# Patient Record
Sex: MALE | Race: BLACK OR AFRICAN AMERICAN | Employment: OTHER | URBAN - METROPOLITAN AREA
[De-identification: names, ages, dates, MRNs, and addresses within clinical notes are randomized per-mention and may not be internally consistent; named-entity substitution may affect disease eponyms.]

---

## 2021-12-30 ENCOUNTER — HOSPITAL ENCOUNTER (OUTPATIENT)
Age: 46
Setting detail: OUTPATIENT SURGERY
Discharge: OTHER INSTITUTION | End: 2021-12-30
Attending: INTERNAL MEDICINE | Admitting: INTERNAL MEDICINE

## 2021-12-30 ENCOUNTER — ANESTHESIA (OUTPATIENT)
Dept: ENDOSCOPY | Age: 46
End: 2021-12-30
Payer: COMMERCIAL

## 2021-12-30 ENCOUNTER — ANESTHESIA EVENT (OUTPATIENT)
Dept: ENDOSCOPY | Age: 46
End: 2021-12-30
Payer: COMMERCIAL

## 2021-12-30 ENCOUNTER — HOSPITAL ENCOUNTER (OUTPATIENT)
Age: 46
Setting detail: OBSERVATION
Discharge: HOME | End: 2021-12-31
Attending: EMERGENCY MEDICINE | Admitting: HOSPITALIST
Payer: COMMERCIAL

## 2021-12-30 DIAGNOSIS — K92.2 GASTROINTESTINAL HEMORRHAGE, UNSPECIFIED GASTROINTESTINAL HEMORRHAGE TYPE: Primary | ICD-10-CM

## 2021-12-30 LAB
ABO + RH BLD: NORMAL
ALBUMIN SERPL-MCNC: 3.2 G/DL (ref 3.5–5)
ALBUMIN/GLOB SERPL: 0.8 {RATIO} (ref 1.1–2.2)
ALP SERPL-CCNC: 74 U/L (ref 45–117)
ALT SERPL-CCNC: 31 U/L (ref 12–78)
ANION GAP SERPL CALC-SCNC: 7 MMOL/L (ref 5–15)
AST SERPL-CCNC: 12 U/L (ref 15–37)
ATRIAL RATE: 86 BPM
BASOPHILS # BLD: 0 K/UL (ref 0–0.1)
BASOPHILS # BLD: 0 K/UL (ref 0–0.1)
BASOPHILS NFR BLD: 0 % (ref 0–1)
BASOPHILS NFR BLD: 1 % (ref 0–1)
BILIRUB SERPL-MCNC: 0.3 MG/DL (ref 0.2–1)
BLOOD GROUP ANTIBODIES SERPL: NORMAL
BUN SERPL-MCNC: 16 MG/DL (ref 6–20)
BUN/CREAT SERPL: 13 (ref 12–20)
CALCIUM SERPL-MCNC: 8.9 MG/DL (ref 8.5–10.1)
CALCULATED P AXIS, ECG09: 52 DEGREES
CALCULATED R AXIS, ECG10: 97 DEGREES
CALCULATED T AXIS, ECG11: 26 DEGREES
CHLORIDE SERPL-SCNC: 105 MMOL/L (ref 97–108)
CO2 SERPL-SCNC: 24 MMOL/L (ref 21–32)
COMMENT, HOLDF: NORMAL
CREAT SERPL-MCNC: 1.28 MG/DL (ref 0.7–1.3)
DIAGNOSIS, 93000: NORMAL
DIFFERENTIAL METHOD BLD: ABNORMAL
DIFFERENTIAL METHOD BLD: NORMAL
EOSINOPHIL # BLD: 0 K/UL (ref 0–0.4)
EOSINOPHIL # BLD: 0.1 K/UL (ref 0–0.4)
EOSINOPHIL NFR BLD: 0 % (ref 0–7)
EOSINOPHIL NFR BLD: 2 % (ref 0–7)
ERYTHROCYTE [DISTWIDTH] IN BLOOD BY AUTOMATED COUNT: 14.5 % (ref 11.5–14.5)
ERYTHROCYTE [DISTWIDTH] IN BLOOD BY AUTOMATED COUNT: 14.6 % (ref 11.5–14.5)
GLOBULIN SER CALC-MCNC: 3.9 G/DL (ref 2–4)
GLUCOSE SERPL-MCNC: 117 MG/DL (ref 65–100)
HCT VFR BLD AUTO: 37.3 % (ref 36.6–50.3)
HCT VFR BLD AUTO: 38.1 % (ref 36.6–50.3)
HGB BLD-MCNC: 12.6 G/DL (ref 12.1–17)
HGB BLD-MCNC: 12.9 G/DL (ref 12.1–17)
IMM GRANULOCYTES # BLD AUTO: 0 K/UL (ref 0–0.04)
IMM GRANULOCYTES # BLD AUTO: 0 K/UL (ref 0–0.04)
IMM GRANULOCYTES NFR BLD AUTO: 0 % (ref 0–0.5)
IMM GRANULOCYTES NFR BLD AUTO: 0 % (ref 0–0.5)
LYMPHOCYTES # BLD: 1.5 K/UL (ref 0.8–3.5)
LYMPHOCYTES # BLD: 1.7 K/UL (ref 0.8–3.5)
LYMPHOCYTES NFR BLD: 20 % (ref 12–49)
LYMPHOCYTES NFR BLD: 32 % (ref 12–49)
MCH RBC QN AUTO: 30 PG (ref 26–34)
MCH RBC QN AUTO: 30.1 PG (ref 26–34)
MCHC RBC AUTO-ENTMCNC: 33.8 G/DL (ref 30–36.5)
MCHC RBC AUTO-ENTMCNC: 33.9 G/DL (ref 30–36.5)
MCV RBC AUTO: 88.6 FL (ref 80–99)
MCV RBC AUTO: 89.2 FL (ref 80–99)
MONOCYTES # BLD: 0.5 K/UL (ref 0–1)
MONOCYTES # BLD: 0.7 K/UL (ref 0–1)
MONOCYTES NFR BLD: 13 % (ref 5–13)
MONOCYTES NFR BLD: 7 % (ref 5–13)
NEUTS SEG # BLD: 2.9 K/UL (ref 1.8–8)
NEUTS SEG # BLD: 5.5 K/UL (ref 1.8–8)
NEUTS SEG NFR BLD: 52 % (ref 32–75)
NEUTS SEG NFR BLD: 73 % (ref 32–75)
NRBC # BLD: 0 K/UL (ref 0–0.01)
NRBC # BLD: 0 K/UL (ref 0–0.01)
NRBC BLD-RTO: 0 PER 100 WBC
NRBC BLD-RTO: 0 PER 100 WBC
P-R INTERVAL, ECG05: 174 MS
PLATELET # BLD AUTO: 260 K/UL (ref 150–400)
PLATELET # BLD AUTO: 278 K/UL (ref 150–400)
PMV BLD AUTO: 9.8 FL (ref 8.9–12.9)
PMV BLD AUTO: 9.8 FL (ref 8.9–12.9)
POTASSIUM SERPL-SCNC: 3.8 MMOL/L (ref 3.5–5.1)
PROT SERPL-MCNC: 7.1 G/DL (ref 6.4–8.2)
Q-T INTERVAL, ECG07: 370 MS
QRS DURATION, ECG06: 92 MS
QTC CALCULATION (BEZET), ECG08: 442 MS
RBC # BLD AUTO: 4.18 M/UL (ref 4.1–5.7)
RBC # BLD AUTO: 4.3 M/UL (ref 4.1–5.7)
SAMPLES BEING HELD,HOLD: NORMAL
SODIUM SERPL-SCNC: 136 MMOL/L (ref 136–145)
SPECIMEN EXP DATE BLD: NORMAL
VENTRICULAR RATE, ECG03: 86 BPM
WBC # BLD AUTO: 5.4 K/UL (ref 4.1–11.1)
WBC # BLD AUTO: 7.5 K/UL (ref 4.1–11.1)

## 2021-12-30 PROCEDURE — 74011250637 HC RX REV CODE- 250/637: Performed by: INTERNAL MEDICINE

## 2021-12-30 PROCEDURE — 80053 COMPREHEN METABOLIC PANEL: CPT

## 2021-12-30 PROCEDURE — 76040000019: Performed by: INTERNAL MEDICINE

## 2021-12-30 PROCEDURE — 74011250636 HC RX REV CODE- 250/636: Performed by: INTERNAL MEDICINE

## 2021-12-30 PROCEDURE — G0378 HOSPITAL OBSERVATION PER HR: HCPCS

## 2021-12-30 PROCEDURE — 74011250636 HC RX REV CODE- 250/636: Performed by: EMERGENCY MEDICINE

## 2021-12-30 PROCEDURE — 76060000031 HC ANESTHESIA FIRST 0.5 HR: Performed by: INTERNAL MEDICINE

## 2021-12-30 PROCEDURE — 93005 ELECTROCARDIOGRAM TRACING: CPT

## 2021-12-30 PROCEDURE — 74011250636 HC RX REV CODE- 250/636: Performed by: NURSE ANESTHETIST, CERTIFIED REGISTERED

## 2021-12-30 PROCEDURE — 85025 COMPLETE CBC W/AUTO DIFF WBC: CPT

## 2021-12-30 PROCEDURE — 74011000250 HC RX REV CODE- 250: Performed by: NURSE ANESTHETIST, CERTIFIED REGISTERED

## 2021-12-30 PROCEDURE — 99285 EMERGENCY DEPT VISIT HI MDM: CPT

## 2021-12-30 PROCEDURE — 86900 BLOOD TYPING SEROLOGIC ABO: CPT

## 2021-12-30 PROCEDURE — 36415 COLL VENOUS BLD VENIPUNCTURE: CPT

## 2021-12-30 PROCEDURE — 74011250636 HC RX REV CODE- 250/636: Performed by: HOSPITALIST

## 2021-12-30 PROCEDURE — 77030040684 HC NDL ENDOSC NEEDLEMASTR OCOA -B: Performed by: INTERNAL MEDICINE

## 2021-12-30 PROCEDURE — 77030010936 HC CLP LIG BSC -C: Performed by: INTERNAL MEDICINE

## 2021-12-30 DEVICE — WORKING LENGTH 235CM, WORKING CHANNEL 2.8MM
Type: IMPLANTABLE DEVICE | Site: SIGMOID COLON | Status: FUNCTIONAL
Brand: RESOLUTION 360 CLIP

## 2021-12-30 RX ORDER — MELATONIN 5 MG
10 CAPSULE ORAL AS NEEDED
COMMUNITY

## 2021-12-30 RX ORDER — AMLODIPINE BESYLATE 10 MG/1
10 TABLET ORAL DAILY
COMMUNITY

## 2021-12-30 RX ORDER — DEXTROMETHORPHAN/PSEUDOEPHED 2.5-7.5/.8
1.2 DROPS ORAL
Status: DISCONTINUED | OUTPATIENT
Start: 2021-12-30 | End: 2021-12-30 | Stop reason: HOSPADM

## 2021-12-30 RX ORDER — SODIUM CHLORIDE 9 MG/ML
INJECTION, SOLUTION INTRAVENOUS
Status: DISCONTINUED | OUTPATIENT
Start: 2021-12-30 | End: 2021-12-30 | Stop reason: HOSPADM

## 2021-12-30 RX ORDER — NALOXONE HYDROCHLORIDE 0.4 MG/ML
0.4 INJECTION, SOLUTION INTRAMUSCULAR; INTRAVENOUS; SUBCUTANEOUS
Status: DISCONTINUED | OUTPATIENT
Start: 2021-12-30 | End: 2021-12-30 | Stop reason: HOSPADM

## 2021-12-30 RX ORDER — ATROPINE SULFATE 0.1 MG/ML
0.5 INJECTION INTRAVENOUS
Status: DISCONTINUED | OUTPATIENT
Start: 2021-12-30 | End: 2021-12-30 | Stop reason: HOSPADM

## 2021-12-30 RX ORDER — SODIUM CHLORIDE 0.9 % (FLUSH) 0.9 %
5-40 SYRINGE (ML) INJECTION AS NEEDED
Status: DISCONTINUED | OUTPATIENT
Start: 2021-12-30 | End: 2021-12-31 | Stop reason: HOSPADM

## 2021-12-30 RX ORDER — EPINEPHRINE 0.1 MG/ML
1 INJECTION INTRACARDIAC; INTRAVENOUS
Status: COMPLETED | OUTPATIENT
Start: 2021-12-30 | End: 2021-12-30

## 2021-12-30 RX ORDER — PROPOFOL 10 MG/ML
INJECTION, EMULSION INTRAVENOUS AS NEEDED
Status: DISCONTINUED | OUTPATIENT
Start: 2021-12-30 | End: 2021-12-30 | Stop reason: HOSPADM

## 2021-12-30 RX ORDER — DEXTROMETHORPHAN POLISTIREX 30 MG/5 ML
SUSPENSION, EXTENDED RELEASE 12 HR ORAL AS NEEDED
Status: DISCONTINUED | OUTPATIENT
Start: 2021-12-30 | End: 2021-12-31 | Stop reason: HOSPADM

## 2021-12-30 RX ORDER — FLUMAZENIL 0.1 MG/ML
0.2 INJECTION INTRAVENOUS
Status: DISCONTINUED | OUTPATIENT
Start: 2021-12-30 | End: 2021-12-30 | Stop reason: HOSPADM

## 2021-12-30 RX ORDER — EPINEPHRINE 1 MG/ML
INJECTION, SOLUTION, CONCENTRATE INTRAVENOUS
Status: DISCONTINUED
Start: 2021-12-30 | End: 2021-12-30 | Stop reason: WASHOUT

## 2021-12-30 RX ORDER — SODIUM CHLORIDE 9 MG/ML
100 INJECTION, SOLUTION INTRAVENOUS CONTINUOUS
Status: DISPENSED | OUTPATIENT
Start: 2021-12-30 | End: 2021-12-31

## 2021-12-30 RX ORDER — SODIUM CHLORIDE 9 MG/ML
50 INJECTION, SOLUTION INTRAVENOUS CONTINUOUS
Status: DISCONTINUED | OUTPATIENT
Start: 2021-12-30 | End: 2021-12-30

## 2021-12-30 RX ORDER — SODIUM CHLORIDE 0.9 % (FLUSH) 0.9 %
5-40 SYRINGE (ML) INJECTION EVERY 8 HOURS
Status: DISCONTINUED | OUTPATIENT
Start: 2021-12-30 | End: 2021-12-31 | Stop reason: HOSPADM

## 2021-12-30 RX ORDER — LIDOCAINE HYDROCHLORIDE 20 MG/ML
INJECTION, SOLUTION EPIDURAL; INFILTRATION; INTRACAUDAL; PERINEURAL AS NEEDED
Status: DISCONTINUED | OUTPATIENT
Start: 2021-12-30 | End: 2021-12-30 | Stop reason: HOSPADM

## 2021-12-30 RX ADMIN — PROPOFOL 20 MG: 10 INJECTION, EMULSION INTRAVENOUS at 16:39

## 2021-12-30 RX ADMIN — PROPOFOL 20 MG: 10 INJECTION, EMULSION INTRAVENOUS at 16:48

## 2021-12-30 RX ADMIN — SODIUM CHLORIDE: 900 INJECTION, SOLUTION INTRAVENOUS at 16:37

## 2021-12-30 RX ADMIN — PROPOFOL 100 MG: 10 INJECTION, EMULSION INTRAVENOUS at 16:37

## 2021-12-30 RX ADMIN — PROPOFOL 20 MG: 10 INJECTION, EMULSION INTRAVENOUS at 16:45

## 2021-12-30 RX ADMIN — MINERAL OIL: 100 ENEMA RECTAL at 16:13

## 2021-12-30 RX ADMIN — EPINEPHRINE 0.1 MG: 0.1 INJECTION INTRACARDIAC; INTRAVENOUS at 16:51

## 2021-12-30 RX ADMIN — PROPOFOL 20 MG: 10 INJECTION, EMULSION INTRAVENOUS at 16:56

## 2021-12-30 RX ADMIN — PROPOFOL 20 MG: 10 INJECTION, EMULSION INTRAVENOUS at 16:41

## 2021-12-30 RX ADMIN — SODIUM CHLORIDE, POTASSIUM CHLORIDE, SODIUM LACTATE AND CALCIUM CHLORIDE 1000 ML: 600; 310; 30; 20 INJECTION, SOLUTION INTRAVENOUS at 14:25

## 2021-12-30 RX ADMIN — LIDOCAINE HYDROCHLORIDE 60 MG: 20 INJECTION, SOLUTION EPIDURAL; INFILTRATION; INTRACAUDAL; PERINEURAL at 16:37

## 2021-12-30 RX ADMIN — PROPOFOL 20 MG: 10 INJECTION, EMULSION INTRAVENOUS at 16:43

## 2021-12-30 RX ADMIN — SODIUM CHLORIDE 100 ML/HR: 900 INJECTION, SOLUTION INTRAVENOUS at 19:00

## 2021-12-30 RX ADMIN — PROPOFOL 20 MG: 10 INJECTION, EMULSION INTRAVENOUS at 16:50

## 2021-12-30 RX ADMIN — PROPOFOL 20 MG: 10 INJECTION, EMULSION INTRAVENOUS at 16:58

## 2021-12-30 RX ADMIN — PROPOFOL 20 MG: 10 INJECTION, EMULSION INTRAVENOUS at 16:53

## 2021-12-30 NOTE — ANESTHESIA PREPROCEDURE EVALUATION
Relevant Problems   No relevant active problems       Anesthetic History   No history of anesthetic complications            Review of Systems / Medical History  Patient summary reviewed, nursing notes reviewed and pertinent labs reviewed    Pulmonary        Sleep apnea: CPAP           Neuro/Psych   Within defined limits           Cardiovascular    Hypertension              Exercise tolerance: >4 METS     GI/Hepatic/Renal               Comments: Bloody stools following polypectomy today Endo/Other        Morbid obesity     Other Findings              Physical Exam    Airway  Mallampati: III  TM Distance: > 6 cm  Neck ROM: normal range of motion   Mouth opening: Normal     Cardiovascular  Regular rate and rhythm,  S1 and S2 normal,  no murmur, click, rub, or gallop             Dental  No notable dental hx       Pulmonary  Breath sounds clear to auscultation               Abdominal  GI exam deferred       Other Findings            Anesthetic Plan    ASA: 3  Anesthesia type: MAC          Induction: Intravenous  Anesthetic plan and risks discussed with: Patient

## 2021-12-30 NOTE — PERIOP NOTES
TRANSFER - IN REPORT:    Verbal report received from Lazarus Romo, [facility] (name) on Andrei Julee  being received from ER (unit) for ordered procedure      Information from the following report(s) SBAR, ED Summary, MAR, Recent Results, Pre Procedure Checklist, Procedure Verification and Quality Measures was reviewed with the receiving nurse. Opportunity for questions and clarification was provided.

## 2021-12-30 NOTE — PROGRESS NOTES
Spiritual Care Assessment/Progress Note  [facility]      NAME: Andrei Ladd      MRN: [id number]  AGE: 51 y.o. SEX: male  Confucianist Affiliation: Jain   Language: English     12/30/2021     Total Time (in minutes): 20     Spiritual Assessment begun in [address] through conversation with:         []Patient        [] Family    [] Friend(s)        Reason for Consult: Code Blue/99     Spiritual beliefs: (Please include comment if needed)     [] Identifies with a jarocho tradition:         [] Supported by a jarocho community:            [] Claims no spiritual orientation:           [] Seeking spiritual identity:                [] Adheres to an individual form of spirituality:           [x] Not able to assess:                           Identified resources for coping:      [] Prayer                               [] Music                  [] Guided Imagery     [] Family/friends                 [] Pet visits     [] Devotional reading                         [x] Unknown     [] Other:                                               Interventions offered during this visit: (See comments for more details)                Plan of Care:     [x] Support spiritual and/or cultural needs    [] Support AMD and/or advance care planning process      [] Support grieving process   [] Coordinate Rites and/or Rituals    [] Coordination with community clergy   [] No spiritual needs identified at this time   [] Detailed Plan of Care below (See Comments)  [] Make referral to Music Therapy  [] Make referral to Pet Therapy     [] Make referral to Addiction services  [] Make referral to [facility]  [] Make referral to Spiritual Care Partner  [] No future visits requested        [x] Contact Spiritual Care for further referrals     Comments: Response to Code Stroke in Outpatient waiting area. Unable to assess due to medical interventions. Patient spouse was present. Advised of  availability.  Please contact Spiritual Care for any further referrals. Visited by: Kathia Rosario.  Eula Omer, [address] [place] [address] paging Service [phone])

## 2021-12-30 NOTE — H&P
History & Physical    Primary Care Provider: Other, MD Peter  Source of Information: Patient     History of Presenting Illness:   Jass France is a 51 y.o. male who presents with syncope and rectal bleeding     This is a 26-year-old male who was just released this morning from having colonoscopy and removal of a large polyp. Since getting home, he has had continued bleeding and was told to come back to the hospital by GI. He was in registration and had a syncopal episode. He apparently had blood in the chair and blood in the floor when he was picked up and put on stretcher. Is having no abdominal pain has had no nausea or vomiting. He did take some blood pressure medication on his way back to the hospital prior to syncope. He has been seen in the ED by GI and they are taking him back to the ENDO for further colonoscopy: s/p clips and epi injection. And he will be monitored post procedure. He is currently alert and oriented x3 and without complaint. No abd pain, no more bleeding. Review of Systems:  General: HPI, no changes of weight  HEENT: no headache, no vision changes, no nose discharge, no hearing changes   RES: no wheezing, no cough, no sob  CVS: no cp, no palpitation. Muscular: no joint swelling, no muscle pain, no leg swelling  Skin: no rash, no itching   GI: HPI   : no dysuria, no hematuria  Hemo: no gum bleeding, no petechial   Neuro: no sensation changes, no focal weakness , HPI   Endo: no polydipsia   Psych: denied depression     History reviewed. No pertinent past medical history. Past Surgical History:   Procedure Laterality Date    FLEXIBLE SIGMOIDOSCOPY N/A 12/30/2021    SIGMOIDOSCOPY FLEXIBLE performed by Kayla Lim MD at [facility] ENDOSCOPY    HX UROLOGICAL      kidney stone surgery     Prior to Admission medications    Medication Sig Start Date End Date Taking?  Authorizing Provider   amLODIPine (NORVASC) 10 mg tablet Take 10 mg by mouth daily. Yes Provider, Historical   melatonin 5 mg cap capsule Take 10 mg by mouth as needed. bedtime   Yes Provider, Historical     No Known Allergies   History reviewed. No pertinent family history. SOCIAL HISTORY:  Patient resides:  Independently x   Assisted Living    SNF    With family care       Smoking history:   None x   Former    Chronic      Alcohol history:   None x   Social    Chronic      Ambulates:   Independently x   w/cane    w/walker    w/wc    CODE STATUS:  DNR    Full x   Other      Objective:     Physical Exam:     Visit Vitals  /74   Pulse 79   Temp 99.7 °F (37.6 °C)   Resp 19   Ht 6' 6\" (1.981 m)   Wt 158.8 kg (350 lb)   SpO2 97%   BMI 40.45 kg/m²      O2 Device: None    General:  Alert, cooperative, no distress, appears stated age. Head:  Normocephalic, without obvious abnormality, atraumatic. Eyes:  Conjunctivae/corneas clear. PERRL, EOMs intact. Nose: Nares normal. Septum midline. Mucosa normal. No drainage or sinus tenderness. Throat: Lips, mucosa, and tongue normal. Teeth and gums normal.   Neck: Supple, symmetrical, trachea midline, no adenopathy, thyroid: no enlargement/tenderness/nodules, no carotid bruit and no JVD. Back:   Symmetric, no curvature. ROM normal. No CVA tenderness. Lungs:   Clear to auscultation bilaterally. Chest wall:  No tenderness or deformity. Heart:  Regular rate and rhythm, S1, S2 normal, no murmur, click, rub or gallop. Abdomen:   Soft, non-tender. Bowel sounds normal. No masses,  No organomegaly. Extremities: Extremities normal, atraumatic, no cyanosis or edema. Pulses: 2+ and symmetric all extremities. Skin: Skin color, texture, turgor normal. No rashes or lesions   Neurologic: CNII-XII intact.  None focal          Data Review:     Recent Days:  Recent Labs     12/30/21  1417   WBC 5.4   HGB 12.9   HCT 38.1        Recent Labs     12/30/21  1417      K 3.8      CO2 24   *   BUN 16   CREA 1.28   CA 8. 9   ALB 3.2*   ALT 31     No results for input(s): PH, PCO2, PO2, HCO3, FIO2 in the last 72 hours. 24 Hour Results:  Recent Results (from the past 24 hour(s))   EKG, 12 LEAD, INITIAL    Collection Time: 12/30/21  2:07 PM   Result Value Ref Range    Ventricular Rate 86 BPM    Atrial Rate 86 BPM    P-R Interval 174 ms    QRS Duration 92 ms    Q-T Interval 370 ms    QTC Calculation (Bezet) 442 ms    Calculated P Axis 52 degrees    Calculated R Axis 97 degrees    Calculated T Axis 26 degrees    Diagnosis       Normal sinus rhythm  Rightward axis  Otherwise normal  No previous ECGs available  Confirmed by Tessie Gilman M.D., Ashley Ruff ([id number]) on 12/30/2021 4:40:19 PM     CBC WITH AUTOMATED DIFF    Collection Time: 12/30/21  2:17 PM   Result Value Ref Range    WBC 5.4 4.1 - 11.1 K/uL    RBC 4.30 4. 10 - 5.70 M/uL    HGB 12.9 12.1 - 17.0 g/dL    HCT 38.1 36.6 - 50.3 %    MCV 88.6 80.0 - 99.0 FL    MCH 30.0 26.0 - 34.0 PG    MCHC 33.9 30.0 - 36.5 g/dL    RDW 14.6 (H) 11.5 - 14.5 %    PLATELET 427 349 - 230 K/uL    MPV 9.8 8.9 - 12.9 FL    NRBC 0.0 0  WBC    ABSOLUTE NRBC 0.00 0.00 - 0.01 K/uL    NEUTROPHILS 52 32 - 75 %    LYMPHOCYTES 32 12 - 49 %    MONOCYTES 13 5 - 13 %    EOSINOPHILS 2 0 - 7 %    BASOPHILS 1 0 - 1 %    IMMATURE GRANULOCYTES 0 0.0 - 0.5 %    ABS. NEUTROPHILS 2.9 1.8 - 8.0 K/UL    ABS. LYMPHOCYTES 1.7 0.8 - 3.5 K/UL    ABS. MONOCYTES 0.7 0.0 - 1.0 K/UL    ABS. EOSINOPHILS 0.1 0.0 - 0.4 K/UL    ABS. BASOPHILS 0.0 0.0 - 0.1 K/UL    ABS. IMM.  GRANS. 0.0 0.00 - 0.04 K/UL    DF AUTOMATED     METABOLIC PANEL, COMPREHENSIVE    Collection Time: 12/30/21  2:17 PM   Result Value Ref Range    Sodium 136 136 - 145 mmol/L    Potassium 3.8 3.5 - 5.1 mmol/L    Chloride 105 97 - 108 mmol/L    CO2 24 21 - 32 mmol/L    Anion gap 7 5 - 15 mmol/L    Glucose 117 (H) 65 - 100 mg/dL    BUN 16 6 - 20 MG/DL    Creatinine 1.28 0.70 - 1.30 MG/DL    BUN/Creatinine ratio 13 12 - 20      GFR est AA >60 >60 ml/min/1.73m2    GFR est non-AA >60 >60 ml/min/1.73m2    Calcium 8.9 8.5 - 10.1 MG/DL    Bilirubin, total 0.3 0.2 - 1.0 MG/DL    ALT (SGPT) 31 12 - 78 U/L    AST (SGOT) 12 (L) 15 - 37 U/L    Alk. phosphatase 74 45 - 117 U/L    Protein, total 7.1 6.4 - 8.2 g/dL    Albumin 3.2 (L) 3.5 - 5.0 g/dL    Globulin 3.9 2.0 - 4.0 g/dL    A-G Ratio 0.8 (L) 1.1 - 2.2     TYPE & SCREEN    Collection Time: 12/30/21  2:17 PM   Result Value Ref Range    Crossmatch Expiration 01/02/2022,5301     ABO/Rh(D) B POSITIVE     Antibody screen NEG    SAMPLES BEING HELD    Collection Time: 12/30/21  2:17 PM   Result Value Ref Range    SAMPLES BEING HELD 1BLU     COMMENT        Add-on orders for these samples will be processed based on acceptable specimen integrity and analyte stability, which may vary by analyte. Imaging:   No results found. Assessment:     Active Problems:    GIB (gastrointestinal bleeding) (12/30/2021)           Plan:     1. Syncoe: due to hypotension/GIB hypovolemia. Resolved now.,  2. Lower GIB: post polypectomy , taj from the residual stalk from polypectomy:Two hemostatic clips were deployed across the stalk base for secondary hemorrhage prophylaxis per GI. Will monitor CBC. Clear liquid diet tonight.         Signed By: Tylor Sheriff MD     December 30, 2021

## 2021-12-30 NOTE — PROGRESS NOTES

## 2021-12-30 NOTE — H&P
Pre-Endoscopy H&P   Chief complaint: hematochezia    HPI:  Patient presents for procedure. The indication for the procedure, the patient's history and the patient's current medications are reviewed prior to the procedure and that data is reported on the endoscopy note in the chart to which this document is attached. Any significant complaints with regard to organ systems will be noted, and if not mentioned then a review of systems is not contributory. History reviewed. No pertinent past medical history. Past Surgical History:   Procedure Laterality Date    HX UROLOGICAL      kidney stone surgery     Social   Social History     Tobacco Use    Smoking status: Never Smoker    Smokeless tobacco: Never Used   Substance Use Topics    Alcohol use: Yes     Comment: socially      History reviewed. No pertinent family history. No Known Allergies   Prior to Admission Medications   Prescriptions Last Dose Informant Patient Reported? Taking? amLODIPine (NORVASC) 10 mg tablet 12/30/2021 at Unknown time  Yes Yes   Sig: Take 10 mg by mouth daily. melatonin 5 mg cap capsule 12/28/2021  Yes Yes   Sig: Take 10 mg by mouth as needed. bedtime      Facility-Administered Medications: None       PHYSICAL EXAM:  The patient is examined immediately prior to the procedure. Visit Vitals  BP (!) 124/91   Pulse 91   Temp 97.8 °F (36.6 °C)   Resp 23   Ht 6' 6\" (1.981 m)   Wt 158.8 kg (350 lb)   SpO2 96%   BMI 40.45 kg/m²     Gen: Appears comfortable, no distress. Pulm: comfortable respirations with no abnormal audible breath sounds  CV: heart regular, well perfused  GI: abdomen flat. ASSESSMENT:  Patient here for procedure. The indication for the procedure, the patient's history and the patient's current medications are reviewed prior to the procedure and that data is reported on the endoscopy note in the chart to which this document is attached.     PLAN:  Informed consent discussion held, patient afforded an opportunity to ask questions and all questions answered. After being advised of the risks, benefits, and alternatives, the patient requested that we proceed and indicated so on a written consent form. Will proceed with procedure as planned.   Seven Silver MD

## 2021-12-30 NOTE — ED NOTES
TRANSFER - OUT REPORT:    Verbal report given to endoscopy RN(name) on Tony Reach  being transferred to endoscopy(unit) for ordered procedure       Report consisted of patients Situation, Background, Assessment and   Recommendations(SBAR). Information from the following report(s) SBAR, ED Summary and Recent Results was reviewed with the receiving nurse. Lines:   Peripheral IV 12/30/21 Left Antecubital (Active)        Opportunity for questions and clarification was provided.       Patient transported with:   [employer]

## 2021-12-30 NOTE — PERIOP NOTES
Report from [facility]  Pt verbalizes understanding of procedure findings. 1720: . TRANSFER - OUT REPORT:    Verbal report given to Chadwick(name) on Delmi Britt  being transferred to [facility](unit) for routine post - op       Report consisted of patients Situation, Background, Assessment and   Recommendations(SBAR). Information from the following report(s) Procedure Summary was reviewed with the receiving nurse. Lines:   Peripheral IV 12/30/21 Left Antecubital (Active)        Opportunity for questions and clarification was provided.       Patient transported with his wife and clothing and chart

## 2021-12-30 NOTE — ED PROVIDER NOTES
This is a 42-year-old male who was just released this morning from having colonoscopy and removal of a large polyp. Since getting home, he has had continued bleeding and was told to come back to the hospital by GI. He was in registration and had a syncopal episode. He apparently had blood in the chair and blood in the floor when he was picked up and put on stretcher. Is having no abdominal pain has had no nausea or vomiting. He did take some blood pressure medication on his way back to the hospital prior to syncope. He has been seen in the ED by GI and they are taking him back to the OR for further colonoscopy and possible cauterization or further control of the bleeding site. A rapid response was called and registration when the patient passed out. He is currently alert and oriented x3 and without complaint. No past medical history on file. No past surgical history on file. No family history on file. Social History     Socioeconomic History    Marital status: SINGLE     Spouse name: Not on file    Number of children: Not on file    Years of education: Not on file    Highest education level: Not on file   Occupational History    Not on file   Tobacco Use    Smoking status: Not on file    Smokeless tobacco: Not on file   Substance and Sexual Activity    Alcohol use: Not on file    Drug use: Not on file    Sexual activity: Not on file   Other Topics Concern    Not on file   Social History Narrative    Not on file     Social Determinants of Health     Financial Resource Strain:     Difficulty of Paying Living Expenses: Not on file   Food Insecurity:     Worried About Running Out of Food in the Last Year: Not on file    Stephany of Food in the Last Year: Not on file   Transportation Needs:     Lack of Transportation (Medical): Not on file    Lack of Transportation (Non-Medical):  Not on file   Physical Activity:     Days of Exercise per Week: Not on file    Minutes of Exercise per Session: Not on file   Stress:     Feeling of Stress : Not on file   Social Connections:     Frequency of Communication with Friends and Family: Not on file    Frequency of Social Gatherings with Friends and Family: Not on file    Attends Hindu Services: Not on file    Active Member of Clubs or Organizations: Not on file    Attends Club or Organization Meetings: Not on file    Marital Status: Not on file   Intimate Partner Violence:     Fear of Current or Ex-Partner: Not on file    Emotionally Abused: Not on file    Physically Abused: Not on file    Sexually Abused: Not on file   Housing Stability:     Unable to Pay for Housing in the Last Year: Not on file    Number of [place] in the Last Year: Not on file    Unstable Housing in the Last Year: Not on file         ALLERGIES: Patient has no known allergies. Review of Systems   Constitutional: Negative for activity change, appetite change and fatigue. HENT: Negative for ear pain, facial swelling, sore throat and trouble swallowing. Eyes: Negative for pain, discharge and visual disturbance. Respiratory: Negative for chest tightness, shortness of breath and wheezing. Cardiovascular: Negative for chest pain and palpitations. Gastrointestinal: Positive for anal bleeding. Negative for abdominal pain, blood in stool, nausea and vomiting. Genitourinary: Negative for difficulty urinating, flank pain and hematuria. Musculoskeletal: Negative for arthralgias, joint swelling, myalgias and neck pain. Skin: Negative for color change and rash. Neurological: Positive for syncope. Negative for dizziness, weakness, numbness and headaches. Hematological: Negative for adenopathy. Does not bruise/bleed easily. Psychiatric/Behavioral: Negative for behavioral problems, confusion and sleep disturbance. All other systems reviewed and are negative.       Vitals:    12/30/21 1359   BP: (!) 130/119   Pulse: 80   Resp: 24   Temp: 97.6 °F (36.4 °C)   SpO2: 95%   Weight: 158.8 kg (350 lb)   Height: 6' 6\" (1.981 m)            Physical Exam  Vitals and nursing note reviewed. Constitutional:       General: He is not in acute distress. Appearance: He is well-developed. He is not ill-appearing or diaphoretic. Comments: This is a 69-year-old male with a syncopal episode following surgical removal of a polyp. HENT:      Head: Normocephalic and atraumatic. Nose: Nose normal.   Eyes:      General: No scleral icterus. Conjunctiva/sclera: Conjunctivae normal.      Pupils: Pupils are equal, round, and reactive to light. Neck:      Thyroid: No thyromegaly. Vascular: No JVD. Trachea: No tracheal deviation. Comments: No carotid bruits noted. Cardiovascular:      Rate and Rhythm: Normal rate and regular rhythm. Heart sounds: Normal heart sounds. No murmur heard. No friction rub. No gallop. Pulmonary:      Effort: Pulmonary effort is normal. No respiratory distress. Breath sounds: Normal breath sounds. No wheezing or rales. Chest:      Chest wall: No tenderness. Abdominal:      General: Bowel sounds are normal. There is no distension. Palpations: Abdomen is soft. There is no mass. Tenderness: There is no abdominal tenderness. There is no guarding or rebound. Musculoskeletal:         General: No tenderness. Normal range of motion. Cervical back: Normal range of motion and neck supple. Lymphadenopathy:      Cervical: No cervical adenopathy. Skin:     General: Skin is warm and dry. Capillary Refill: Capillary refill takes 2 to 3 seconds. Findings: No erythema or rash. Neurological:      General: No focal deficit present. Mental Status: He is alert and oriented to person, place, and time. Cranial Nerves: No cranial nerve deficit. Coordination: Coordination normal.      Deep Tendon Reflexes: Reflexes are normal and symmetric.    Psychiatric:         Behavior: Behavior normal.         Thought Content: Thought content normal.         Judgment: Judgment normal.          MDM  Number of Diagnoses or Management Options  Gastrointestinal hemorrhage, unspecified gastrointestinal hemorrhage type: new and requires workup     Amount and/or Complexity of Data Reviewed  Clinical lab tests: ordered and reviewed  Decide to obtain previous medical records or to obtain history from someone other than the patient: yes  Review and summarize past medical records: yes  Discuss the patient with other providers: yes    Risk of Complications, Morbidity, and/or Mortality  Presenting problems: high  Diagnostic procedures: moderate  Management options: moderate    Patient Progress  Patient progress: stable         Procedures    This is a 50-year-old male who had a syncopal episode in registration after having polypectomy and continued rectal bleeding this morning. He denies any abdominal pain and hemodynamically appears to be stable. We will give him some additional IV fluids and check his hemoglobin. Patient is already been seen by GI and the plan is to take him back down to the operating room for another flex sig and control the bleeding. Patient is alert and oriented x3 in no acute distress. ED MD EKG interpretation: There is a normal sinus rhythm at 82 beats a minute. Axis is normal intervals are normal.  There is some inverted T wave and ST depression in 3 and aVF. No ectopy or other acute ischemic changes noted. Rivera Blackwell MD    The patient was seen by GI. I have given him fluids and he was sent directly to the operating room for an additional sigmoidoscopy for control of bleeding. Patient was initially hypotensive with a blood pressure in the 80s but responded well with fluid. His hemoglobin in the ED was 11.9.

## 2021-12-30 NOTE — CONSULTS
[address], [address] [place] [address]                Gastrointestinal Abdominal Pain    Subjective:      Beth Gutierres is a 51 y.o. male who underwent screening colonoscopy this morning during which he had two low risk adenomas resected in right colon and 20mm peduculated polyp resected w hot snare after coagulation current to stalk. Was contacted several hours after procedure by wife that he had several episodes of hematochezia. Instructed him to come to [facility] endoscopy suite for flex sig however syncopized in waiting room during registration. After resuscitation in ED, now in endo suite alert and oriented for flex sig with hemostatic therapy. Hematochezia stopped over the past few hours. Home Medications:  Prior to Admission Medications   Prescriptions Last Dose Informant Patient Reported? Taking? amLODIPine (NORVASC) 10 mg tablet 12/30/2021 at Unknown time  Yes Yes   Sig: Take 10 mg by mouth daily. melatonin 5 mg cap capsule 12/28/2021  Yes Yes   Sig: Take 10 mg by mouth as needed.  bedtime      Facility-Administered Medications: None       Hospital Medications:  Current Facility-Administered Medications   Medication Dose Route Frequency    mineral oil (FLEET) enema   Rectal PRN    0.9% sodium chloride infusion  50 mL/hr IntraVENous CONTINUOUS    sodium chloride (NS) flush 5-40 mL  5-40 mL IntraVENous Q8H    sodium chloride (NS) flush 5-40 mL  5-40 mL IntraVENous PRN    naloxone (NARCAN) injection 0.4 mg  0.4 mg IntraVENous Multiple    flumazeniL (ROMAZICON) 0.1 mg/mL injection 0.2 mg  0.2 mg IntraVENous Multiple    simethicone (MYLICON) 75QB/4.7WN oral drops 80 mg  1.2 mL Oral Multiple    atropine injection 0.5 mg  0.5 mg IntraVENous ONCE PRN    EPINEPHrine (ADRENALIN) 0.1 mg/mL syringe 1 mg  1 mg Endoscopically ONCE PRN       Prior Endoscopy:  See above    Prior Surgeries:  No intraabdominal surgeries    Family History:  No history of colon, gastric, or pancreatic malignancy    Social History:  No ETOH  No Tobacco  No illicits    Review of Systems:  14 point review of systems completed and otherwise negative unless mentioned above. Objective:        Patient Vitals for the past 8 hrs:   BP Temp Pulse Resp SpO2 Height Weight   12/30/21 1601 (!) 124/91 97.8 °F (36.6 °C) 91 23 96 % 6' 6\" (1.981 m) 158.8 kg (350 lb)   12/30/21 1500 105/68  71 18 95 %     12/30/21 1445 102/68  79 12 96 %     12/30/21 1430 92/70  79 14 92 %     12/30/21 1425 (!) 81/52  79 14 97 %     12/30/21 1420 (!) 82/57  82 19 93 %     12/30/21 1415   87 18 92 %     12/30/21 1410   85 17 95 %     12/30/21 1359 (!) 130/119 97.6 °F (36.4 °C) 80 24 95 % 6' 6\" (1.981 m) 158.8 kg (350 lb)       Physical Exam:  CONSTITUTIONAL: Conversant, well developed male in NAD. EYES: Anicteric sclerae; no lid-lag or proptosis. RESPIRATORY: Normal respiratory effort. CARDIOVASCULAR: No peripheral edema. ABDOMEN: no abdominal tenderness, no abdominal distention  REED: large pool of BRB collected around perineum in bed  SKIN: No rash, lesions or ulcers. MUSCULOSKELETAL No digital cyanosis. Moving extremities spontaneously  NEURO: Cranial nerves IIXII grossly intact. PSYCH: Intact judgment and insight. A&OX3 with a cordial affect      Laboratory:   Reviewed in chart      Assessment:   Adult male presents with hematochezia after polypectomy of high risk adenoma.      Plan:   (1) Hematochezia, iatrogenic  -flex sig with planned hemostasis with epi/clipping  -admit to hospitalist for 24 hr obs  -acute monitoring with serial Hgb measurements q 6 hrs to assess degree of blood loss anemia  -transfuse RBCs for Hgb < 7 g/dL  -no PPI needed  -will re-assess in am    Signed By: Mar Alves MD                       December 30, 2021

## 2021-12-30 NOTE — PROCEDURES
118 [facility].  217 [facility] [address], [address]  [phone]                              Flexible Sigmoidoscopy Procedure Note      Indications:  hematochezia     :  Bertha Arroyo MD    Staff: Endoscopy Technician-1: Jason De La Cruz  Endoscopy Technician-Relief: Iwona Mccain  Endoscopy RN-1: Lars Felty, RN    Referring Provider: Stephen, MD Peter    Sedation:  MAC anesthesia    Procedure Details:  After informed consent was obtained with all risks and benefits of procedure explained and preoperative exam completed, the patient was taken to the endoscopy suite and placed in the left lateral decubitus position. Upon sequential sedation as per above, a digital rectal exam was performed. The Olympus videocolonoscope was inserted in the rectum and carefully advanced to the descending colon. The quality of preparation was appropriate for sigmoidoscopy. The colonoscope was slowly withdrawn with careful evaluation between folds. Findings:  Diffuse, dark red blood in the rectum and sigmoid, able to aspirate and visualize mucosa sufficiently. 30-35cm proximal to anal verge, there was a residual stalk from polypectomy done this morning with a fibrin-platelet plug without active hemorrhage. 1cc of epinephrine was infiltrated into the submucosa at the base of the stalk. Two hemostatic clips were deployed across the stalk base for secondary hemorrhage prophylaxis. Interventions:  endoclip was placed for hemorrhage           Specimens Removed:  * No specimens in log *    Complications: None.      EBL:      Impression:    See Postoperative diagnosis above    Recommendations:   -hemostasis achieved endoscopically  -admit to hospitalist for 24 hr obs  -acute monitoring with serial Hgb measurements q 6 hrs to assess degree of blood loss anemia  -transfuse RBCs for Hgb < 7 g/dL  -no PPI needed  -will re-assess in am, likely discharge tomorrow afternoon  -clear liquids only this evening, advance diet as tolerated in am if no bleeding    Discharge Disposition:  floor    Marychuy Chavez MD  12/30/2021  5:13 PM

## 2021-12-30 NOTE — ED TRIAGE NOTES
Pt arrives after rapid response called in admitting. Pt had endoscopy with Dr. Angel Epley this morning with polyps removed. Pt had numerous episodes of bloody stool and was told to return to hospital. Pt lost consciousness while in registration .

## 2021-12-31 VITALS
HEART RATE: 72 BPM | BODY MASS INDEX: 36.45 KG/M2 | SYSTOLIC BLOOD PRESSURE: 112 MMHG | HEIGHT: 78 IN | TEMPERATURE: 98.2 F | OXYGEN SATURATION: 98 % | RESPIRATION RATE: 16 BRPM | WEIGHT: 315 LBS | DIASTOLIC BLOOD PRESSURE: 81 MMHG

## 2021-12-31 LAB
ALBUMIN SERPL-MCNC: 3.2 G/DL (ref 3.5–5)
ALBUMIN/GLOB SERPL: 1 {RATIO} (ref 1.1–2.2)
ALP SERPL-CCNC: 67 U/L (ref 45–117)
ALT SERPL-CCNC: 28 U/L (ref 12–78)
ANION GAP SERPL CALC-SCNC: 6 MMOL/L (ref 5–15)
AST SERPL-CCNC: 11 U/L (ref 15–37)
BASOPHILS # BLD: 0 K/UL (ref 0–0.1)
BASOPHILS NFR BLD: 0 % (ref 0–1)
BILIRUB SERPL-MCNC: 0.6 MG/DL (ref 0.2–1)
BUN SERPL-MCNC: 15 MG/DL (ref 6–20)
BUN/CREAT SERPL: 15 (ref 12–20)
CALCIUM SERPL-MCNC: 8.6 MG/DL (ref 8.5–10.1)
CHLORIDE SERPL-SCNC: 106 MMOL/L (ref 97–108)
CO2 SERPL-SCNC: 25 MMOL/L (ref 21–32)
CREAT SERPL-MCNC: 1.01 MG/DL (ref 0.7–1.3)
DIFFERENTIAL METHOD BLD: ABNORMAL
EOSINOPHIL # BLD: 0.1 K/UL (ref 0–0.4)
EOSINOPHIL NFR BLD: 1 % (ref 0–7)
ERYTHROCYTE [DISTWIDTH] IN BLOOD BY AUTOMATED COUNT: 14.2 % (ref 11.5–14.5)
GLOBULIN SER CALC-MCNC: 3.2 G/DL (ref 2–4)
GLUCOSE SERPL-MCNC: 94 MG/DL (ref 65–100)
HCT VFR BLD AUTO: 35.7 % (ref 36.6–50.3)
HGB BLD-MCNC: 11.9 G/DL (ref 12.1–17)
IMM GRANULOCYTES # BLD AUTO: 0 K/UL (ref 0–0.04)
IMM GRANULOCYTES NFR BLD AUTO: 0 % (ref 0–0.5)
LYMPHOCYTES # BLD: 3 K/UL (ref 0.8–3.5)
LYMPHOCYTES NFR BLD: 38 % (ref 12–49)
MCH RBC QN AUTO: 29.8 PG (ref 26–34)
MCHC RBC AUTO-ENTMCNC: 33.3 G/DL (ref 30–36.5)
MCV RBC AUTO: 89.5 FL (ref 80–99)
MONOCYTES # BLD: 0.6 K/UL (ref 0–1)
MONOCYTES NFR BLD: 8 % (ref 5–13)
NEUTS SEG # BLD: 4.2 K/UL (ref 1.8–8)
NEUTS SEG NFR BLD: 53 % (ref 32–75)
NRBC # BLD: 0 K/UL (ref 0–0.01)
NRBC BLD-RTO: 0 PER 100 WBC
PLATELET # BLD AUTO: 254 K/UL (ref 150–400)
PMV BLD AUTO: 10.1 FL (ref 8.9–12.9)
POTASSIUM SERPL-SCNC: 3.8 MMOL/L (ref 3.5–5.1)
PROT SERPL-MCNC: 6.4 G/DL (ref 6.4–8.2)
RBC # BLD AUTO: 3.99 M/UL (ref 4.1–5.7)
SODIUM SERPL-SCNC: 137 MMOL/L (ref 136–145)
WBC # BLD AUTO: 7.9 K/UL (ref 4.1–11.1)

## 2021-12-31 PROCEDURE — 85025 COMPLETE CBC W/AUTO DIFF WBC: CPT

## 2021-12-31 PROCEDURE — 74011250636 HC RX REV CODE- 250/636: Performed by: HOSPITALIST

## 2021-12-31 PROCEDURE — G0378 HOSPITAL OBSERVATION PER HR: HCPCS

## 2021-12-31 PROCEDURE — 80053 COMPREHEN METABOLIC PANEL: CPT

## 2021-12-31 PROCEDURE — 36415 COLL VENOUS BLD VENIPUNCTURE: CPT

## 2021-12-31 RX ADMIN — SODIUM CHLORIDE 100 ML/HR: 900 INJECTION, SOLUTION INTRAVENOUS at 05:34

## 2021-12-31 NOTE — PROGRESS NOTES
[address]  174 [facility], [address] [place] [address]    GI PROGRESS NOTE    NAME: Miguel Kramer   :  1975   MRN:  [id number]       Subjective:     No pain, no bleeding   Review of Systems    Constitutional: negative fever, negative chills, negative weight loss  Eyes:   negative visual changes  ENT:   negative sore throat, tongue or lip swelling  Respiratory:  negative cough, negative dyspnea  Cards:  negative for chest pain, palpitations, lower extremity edema  GI:   See HPI  :  negative for frequency, dysuria  Integument:  negative for rash and pruritus  Heme:  negative for easy bruising and gum/nose bleeding  Musculoskel: negative for myalgias,  back pain and muscle weakness  Neuro: negative for headaches, dizziness, vertigo  Psych:  negative for feelings of anxiety, depression         Objective:     VITALS:   Last 24hrs VS reviewed since prior progress note. Most recent are:  Visit Vitals  /81   Pulse 72   Temp 98.2 °F (36.8 °C)   Resp 16   Ht 6' 6\" (1.981 m)   Wt 158.8 kg (350 lb)   SpO2 98%   BMI 40.45 kg/m²       Intake/Output Summary (Last 24 hours) at 2021 1406  Last data filed at 2021 1658  Gross per 24 hour   Intake 200 ml   Output    Net 200 ml     PHYSICAL EXAM:  General: WD, WN. Alert, cooperative, no acute distress    HEENT: NC, Atraumatic. PERRLA, EOMI. Anicteric sclerae. Lungs:  CTA Bilaterally. No Wheezing/Rhonchi/Rales. Heart:  Regular  rhythm,  No murmur (), No Rubs, No Gallops  Abdomen: Soft, Non distended, Non tender.  +Bowel sounds, no HSM  Extremities: No c/c/e  Neurologic:  CN 2-12 gi, Alert and oriented X 3. No acute neurological distress   Psych:   Good insight. Not anxious nor agitated.     Lab Data Reviewed:   Recent Labs     21  0535 21  1845   WBC 7.9 7.5   HGB 11.9* 12.6   HCT 35.7* 37.3    260     Recent Labs     21  0535 21  1417    136   K 3.8 3.8    105   CO2 25 24   BUN 15 16   CREA 1. 01 1.28   GLU 94 117*   CA 8.6 8.9     Recent Labs     12/31/21  0535 12/30/21  1417   AP 67 74   TP 6.4 7.1   ALB 3.2* 3.2*   GLOB 3.2 3.9       ________________________________________________________________________       Assessment:   · Post polypectomy bleeding, resolved after clipping the site     Patient Active Problem List   Diagnosis Code    GIB (gastrointestinal bleeding) K92.2     Plan:   · Avoid NSAIDS  · Advance po   · May d/c today with f/u with dr Nely Nelson  · Discussed with dr jenkins     Signed By: Vani Ann MD     12/31/2021  2:06 PM

## 2021-12-31 NOTE — DISCHARGE INSTRUCTIONS
Discharge Instructions       PATIENT ID: [facility]  MRN: [id number]   YOB: 1975    DATE OF ADMISSION: 12/30/2021  2:06 PM    DATE OF DISCHARGE: 12/31/2021    PRIMARY CARE PROVIDER: Jackie Munguia MD     ATTENDING PHYSICIAN: Shelley Franco MD  DISCHARGING PROVIDER: SHERICE Chapa    To contact this individual call [phone] and ask the  to page. If unavailable ask to be transferred the Adult Hospitalist Department. DISCHARGE DIAGNOSES G. I Bleed    CONSULTATIONS: IP CONSULT TO HOSPITALIST    PROCEDURES/SURGERIES: Procedure(s) with comments:  SIGMOIDOSCOPY FLEXIBLE  INJECTION  RESOLUTION CLIP - 2 clips    PENDING TEST RESULTS:   At the time of discharge the following test results are still pending: None    FOLLOW UP APPOINTMENTS:   Follow-up Information    None          ADDITIONAL CARE RECOMMENDATIONS:   - Please make sure to follow up with your primary care physician within 1-2 weeks of discharge for hospital follow up. You should follow-up with  - Please make sure to continue to monitor for: Chest pain, shortness of breath, high fevers, or sudden weakness or numbness and return to the Emergency Department with any of these symptoms. - If your symptoms return/worsen seek medical attention immediately. - Please get up slowly from a seated or laying position, avoid falls. - Avoid tobacco, alcohol and other illicit drug use. - Diet as tolerated  - Activity restrictions: none        DIET: Regular  Oral Nutritional Supplements: None  None     ACTIVITY: As tolerated    WOUND CARE: None    EQUIPMENT needed: None      DISCHARGE MEDICATIONS:   See Medication Reconciliation Form    · It is important that you take the medication exactly as they are prescribed. · Keep your medication in the bottles provided by the pharmacist and keep a list of the medication names, dosages, and times to be taken in your wallet. · Do not take other medications without consulting your doctor. NOTIFY YOUR PHYSICIAN FOR ANY OF THE FOLLOWING:   Fever over 101 degrees for 24 hours. Chest pain, shortness of breath, fever, chills, nausea, vomiting, diarrhea, change in mentation, falling, weakness, bleeding. Severe pain or pain not relieved by medications. Or, any other signs or symptoms that you may have questions about.       DISPOSITION:   X Home With:   OT  PT  HH  RN       SNF/Inpatient Rehab/LTAC    Independent/assisted living    Hospice    Other:     CDMP Checked:   Yes ***     PROBLEM LIST Updated:  Yes ***       Signed:   SHERICE Saxena  12/31/2021  2:26 PM

## 2021-12-31 NOTE — PROGRESS NOTES
Medicare Outpatient Observation Notice provided to patient/representative with verbal explanation of the notice. Time allotted for questions regarding the notice. Patient /representative provided a completed copy of the observation notice. Copy placed on bedside chart. There are no CM needs at this time. The patient is independent and wife transporting home when stable for discharge. CM following for any discharge needs.     March Grief RN/CRM

## 2021-12-31 NOTE — DISCHARGE SUMMARY
Discharge Summary       PATIENT ID: Jenny Louis  MRN: [id number]   YOB: 1975    DATE OF ADMISSION: 12/30/2021  2:06 PM    DATE OF DISCHARGE: 12/31/21  PRIMARY CARE PROVIDER: Blanche Argueta MD     ATTENDING PHYSICIAN: Renata Wilson MD  DISCHARGING PROVIDER: SHERICE Saxena    To contact this individual call [phone] and ask the  to page. If unavailable ask to be transferred the Adult Hospitalist Department. CONSULTATIONS: IP CONSULT TO HOSPITALIST    PROCEDURES/SURGERIES: Procedure(s) with comments:  SIGMOIDOSCOPY FLEXIBLE  INJECTION  RESOLUTION CLIP - 2 clips    [address] COURSE:   25-year-old male who was just released this morning from having colonoscopy and removal of a large polyp.  Since getting home, he has had continued bleeding and was told to come back to the hospital by Erik Persaud was in registration and had a syncopal episode. [place] apparently had blood in the chair and blood in the floor when he was picked up and put on stretcher.  Is having no abdominal pain has had no nausea or vomiting.  He did take some blood pressure medication on his way back to the hospital prior to syncope. [place] has been seen in the ED by GI and they are taking him back to the ENDO for further colonoscopy: s/p clips and epi injection. And he will be monitored post procedure. DISCHARGE DIAGNOSES / PLAN:      # Syncope:   - due to hypotension/GIB hypovolemia. Resolved now. # Lower GIB:  - post polypectomy , likley from the residual stalk from polypectomy:  - Two hemostatic clips were deployed across the stalk base for secondary hemorrhage    prophylaxis per GI.  - No bleeding post op day 1   - Avoid NSAIDs  - Will F/U with , outpatient.        PENDING TEST RESULTS:   At the time of discharge the following test results are still pending: None    FOLLOW UP APPOINTMENTS:    Follow-up Information     Follow up With Specialties Details Why Contact Maren Edmond MD [facility] Schedule an appointment as soon as possible for a visit  [address]  [phone]             ADDITIONAL CARE RECOMMENDATIONS:   - Please make sure to follow up with your primary care physician within 1-2 weeks of discharge for hospital follow up. You should follow-up with  - Please make sure to continue to monitor for: Chest pain, shortness of breath, high fevers, or sudden weakness or numbness and return to the Emergency Department with any of these symptoms. - If your symptoms return/worsen seek medical attention immediately. - Please get up slowly from a seated or laying position, avoid falls. - Avoid tobacco, alcohol and other illicit drug use. - Diet as tolerated  - Activity restrictions: none       DIET: Regular  Oral Nutritional Supplements: None None    ACTIVITY: As tolerated    WOUND CARE: None    EQUIPMENT needed: None      DISCHARGE MEDICATIONS:  Current Discharge Medication List      CONTINUE these medications which have NOT CHANGED    Details   amLODIPine (NORVASC) 10 mg tablet Take 10 mg by mouth daily. melatonin 5 mg cap capsule Take 10 mg by mouth as needed. bedtime               NOTIFY YOUR PHYSICIAN FOR ANY OF THE FOLLOWING:   Fever over 101 degrees for 24 hours. Chest pain, shortness of breath, fever, chills, nausea, vomiting, diarrhea, change in mentation, falling, weakness, bleeding. Severe pain or pain not relieved by medications. Or, any other signs or symptoms that you may have questions about.     DISPOSITION:   X Home With:   OT  PT  HH  RN       Long term SNF/Inpatient Rehab    Independent/assisted living    Hospice    Other:       PATIENT CONDITION AT DISCHARGE:     Functional status    Poor     Deconditioned    X Independent      Cognition    X Lucid     Forgetful     Dementia      Catheters/lines (plus indication)    Heck     PICC     PEG    X None      Code status   X  Full code     DNR      PHYSICAL EXAMINATION AT DISCHARGE:  General:  A/A/O X 3. NAD. HEENT:  Normocephalic. Sclera anicteric. EOMI. Mucous membranes moist.    Chest:  Resps even/unlabored with symmetrical CWE. Air entry full. Lungs CTA. No use of accessory muscles. CV:  RRR. Normal S1/S2. No M/C/R appreciated. No JVD. No peripheral edema. Cap refill < 3 sec. Peripheral pulses 2+. GI:  Abdomen soft/NT/ND. No organomegaly. No hernia. ABT X 4.    :  Voiding. Neurologic:  Nonfocal.  CN II-XII grossly intact. Face symmetrical.  Speech normal.     Psych:  Cooperative. No anxiety or agitation. No suicidal/homicidal ideation. Skin:  Warm and color appropriate. No rashes or jaundice. Turgor elastic.        CHRONIC MEDICAL DIAGNOSES:  Problem List as of 12/31/2021 Never Reviewed          Codes Class Noted - Resolved    GIB (gastrointestinal bleeding) ICD-10-CM: K92.2  ICD-9-CM: 578.9  12/30/2021 - Present              Greater than 30 minutes were spent with the patient on counseling and coordination of care    Signed:   SHERICE Stern  12/31/2021  1:46 PM

## 2021-12-31 NOTE — ANESTHESIA POSTPROCEDURE EVALUATION
Post-Anesthesia Evaluation and Assessment    Patient: Dora Stafford MRN: [id number]  SSN: [id number]    YOB: 1975  Age: 51 y.o. Sex: male      I have evaluated the patient and they are stable and ready for discharge from the PACU. Cardiovascular Function/Vital Signs  Visit Vitals  /74   Pulse 79   Temp 37.6 °C (99.7 °F)   Resp 19   Ht 6' 6\" (1.981 m)   Wt 158.8 kg (350 lb)   SpO2 97%   BMI 40.45 kg/m²       Patient is status post MAC anesthesia for Procedure(s) with comments:  SIGMOIDOSCOPY FLEXIBLE  INJECTION  RESOLUTION CLIP - 2 clips. Nausea/Vomiting: None    Postoperative hydration reviewed and adequate. Pain:  Pain Scale 1: Numeric (0 - 10) (12/30/21 1806)  Pain Intensity 1: 0 (12/30/21 1806)   Managed    Neurological Status: At baseline    Mental Status, Level of Consciousness: Alert and  oriented to person, place, and time    Pulmonary Status:   O2 Device: None (12/30/21 1745)   Adequate oxygenation and airway patent    Complications related to anesthesia: None    Post-anesthesia assessment completed. No concerns    Signed By: Rj Kirby MD     December 30, 2021              Procedure(s):  SIGMOIDOSCOPY FLEXIBLE  INJECTION  RESOLUTION CLIP.     MAC    <BSHSIANPOST>    INITIAL Post-op Vital signs:   Vitals Value Taken Time   /74 12/30/21 1806   Temp 37.6 °C (99.7 °F) 12/30/21 1806   Pulse 79 12/30/21 1806   Resp 19 12/30/21 1806   SpO2 97 % 12/30/21 1806

## 2022-03-19 PROBLEM — K92.2 GIB (GASTROINTESTINAL BLEEDING): Status: ACTIVE | Noted: 2021-12-30

## 2023-05-15 RX ORDER — AMLODIPINE BESYLATE 10 MG/1
10 TABLET ORAL DAILY
COMMUNITY

## 2025-05-16 PROBLEM — D75.1 POLYCYTHEMIA, SECONDARY: Status: ACTIVE | Noted: 2025-05-16

## 2025-05-16 RX ORDER — 0.9 % SODIUM CHLORIDE 0.9 %
250 INTRAVENOUS SOLUTION INTRAVENOUS ONCE
OUTPATIENT
Start: 2025-05-20 | End: 2025-05-20

## 2025-05-20 ENCOUNTER — HOSPITAL ENCOUNTER (OUTPATIENT)
Facility: HOSPITAL | Age: 50
Setting detail: INFUSION SERIES
Discharge: HOME | End: 2025-05-20
Payer: COMMERCIAL

## 2025-05-20 VITALS
HEIGHT: 77 IN | SYSTOLIC BLOOD PRESSURE: 123 MMHG | TEMPERATURE: 97.8 F | OXYGEN SATURATION: 97 % | WEIGHT: 280 LBS | DIASTOLIC BLOOD PRESSURE: 86 MMHG | BODY MASS INDEX: 33.06 KG/M2 | HEART RATE: 90 BPM | RESPIRATION RATE: 18 BRPM

## 2025-05-20 DIAGNOSIS — D75.1 POLYCYTHEMIA, SECONDARY: Primary | ICD-10-CM

## 2025-05-20 LAB
HCT VFR BLD AUTO: 50.3 % (ref 36.6–50.3)
HGB BLD-MCNC: 17.6 G/DL (ref 12.1–17)

## 2025-05-20 PROCEDURE — 36415 COLL VENOUS BLD VENIPUNCTURE: CPT

## 2025-05-20 PROCEDURE — 85018 HEMOGLOBIN: CPT

## 2025-05-20 PROCEDURE — 85014 HEMATOCRIT: CPT

## 2025-05-20 ASSESSMENT — PAIN SCALES - GENERAL: PAINLEVEL_OUTOF10: 0

## 2025-05-20 NOTE — PROGRESS NOTES
Outpatient Infusion Center Short Visit Progress Note    Mr. Jin admitted to [facility] for Therapeutic phlebotomy(HELD) ambulatory in stable condition. Assessment completed. No new concerns voiced. PIV labs drawn as ordered and sent for processing. Labs not within parameters for treatment today.     Vital Signs:  /86   Pulse 90   Temp 97.8 °F (36.6 °C) (Temporal)   Resp 18   Ht 1.956 m (6' 5\")   Wt 127 kg (280 lb)   SpO2 97%   BMI 33.20 kg/m²       Lab Results:  Recent Results (from the past 12 hours)   Hemoglobin and Hematocrit    Collection Time: 05/20/25  1:34 PM   Result Value Ref Range    Hemoglobin 17.6 (H) 12.1 - 17.0 g/dL    Hematocrit 50.3 36.6 - 50.3 %               Pt D/Cd from [facility] ambulatory and in no distress. Next appt not scheduled.         May 20, 2025    No future appointments.

## (undated) DEVICE — Device: Brand: SINGLE USE INJECTOR NM600/610